# Patient Record
Sex: FEMALE | Race: OTHER | HISPANIC OR LATINO | URBAN - METROPOLITAN AREA
[De-identification: names, ages, dates, MRNs, and addresses within clinical notes are randomized per-mention and may not be internally consistent; named-entity substitution may affect disease eponyms.]

---

## 2020-03-25 ENCOUNTER — EMERGENCY (EMERGENCY)
Facility: HOSPITAL | Age: 35
LOS: 1 days | Discharge: HOME | End: 2020-03-25
Admitting: EMERGENCY MEDICINE
Payer: COMMERCIAL

## 2020-03-25 VITALS — HEIGHT: 60 IN | TEMPERATURE: 100 F | WEIGHT: 134.92 LBS

## 2020-03-25 DIAGNOSIS — R53.81 OTHER MALAISE: ICD-10-CM

## 2020-03-25 DIAGNOSIS — J06.9 ACUTE UPPER RESPIRATORY INFECTION, UNSPECIFIED: ICD-10-CM

## 2020-03-25 DIAGNOSIS — R50.9 FEVER, UNSPECIFIED: ICD-10-CM

## 2020-03-25 PROCEDURE — 99284 EMERGENCY DEPT VISIT MOD MDM: CPT

## 2020-03-25 PROCEDURE — 87635 SARS-COV-2 COVID-19 AMP PRB: CPT

## 2020-03-25 PROCEDURE — 99283 EMERGENCY DEPT VISIT LOW MDM: CPT

## 2020-03-25 NOTE — ED PROVIDER NOTE - CLINICAL SUMMARY MEDICAL DECISION MAKING FREE TEXT BOX
34 y/o F presenting with malaise, body aches, and low grade intermittent fever x 1 week. COVID-19 testing pending. Patient is otherwise non-toxic and well-appearing in no respiratory distress. No known hx of exposure to positive coronavirus cases or international travel to ([place], [place], [place], [place], [place]). Patient has been given education on Novel Coronavirus and testing education. Patient has been advised to self-quarantine for 14 days, await test results, and given return precautions for any concerning or worsening of their symptoms.

## 2020-03-25 NOTE — ED ADULT TRIAGE NOTE - CHIEF COMPLAINT QUOTE
Patient complaining of chills and body aches.  Patient denies any cough, fevers, diarrhea, exposure to Covid-19, or any other complaints at this time.

## 2020-03-25 NOTE — ED PROVIDER NOTE - PATIENT PORTAL LINK FT
You can access the FollowMyHealth Patient Portal offered by [facility] by registering at the following website: [email or web address]. By joining [employer]’s FollowMyHealth portal, you will also be able to view your health information using other applications (apps) compatible with our system.

## 2020-03-25 NOTE — ED ADULT NURSE NOTE - NSIMPLEMENTINTERV_GEN_ALL_ED
Implemented All Universal Safety Interventions:  [place] to call system. Call bell, personal items and telephone within reach. Instruct patient to call for assistance. Room bathroom lighting operational. Non-slip footwear when patient is off stretcher. Physically safe environment: no spills, clutter or unnecessary equipment. Stretcher in lowest position, wheels locked, appropriate side rails in place.

## 2020-03-25 NOTE — ED PROVIDER NOTE - NSFOLLOWUPINSTRUCTIONS_ED_ALL_ED_FT
I have discussed the discharge plan with the patient. The patient agrees with the plan, as discussed.  The patient understands Emergency Department diagnosis is a preliminary diagnosis often based on limited information and that the patient must adhere to the follow-up plan as discussed.  The patient understands that if the symptoms worsen or if prescribed medications do not have the desired/planned effect that the patient may return to the Emergency Department at any time for further evaluation and treatment.  If you develop worsening symptoms, such as severe shortness of breath, please call [phone] option #9. They will assist you in determining your next steps.     You recieved a viral swab and testing for COVID-19 in the ED today. You will recieve a call soon with your results for the viral swab, while your COVID test result will take 5-7 days. As of now you should isolate yourself for the next 14 days by staying home from work/school or other obligations.    You should also:  Avoid highly populated and public areas. Avoid using public transportation, ride-sharing, or taxis.   As much as possible, separate yourself from other people in your home. If you can, you should sleep in a room away from other people in your home. Also, you should use a separate bathroom, if available.   Wear the supplied mask whenever you are around other people.   If you have a non-urgent medical appointment, please reschedule for a later date. If the appointment is urgent, please call the healthcare provider first   Wash your hands often with soap and water for at least 15 to 20 seconds or clean your hands with an alcohol-based hand  that contains 60 to 95% alcohol, covering all surfaces of your hands and rubbing them together until they feel dry. Soap and water should be used preferentially if hands are visibly dirty.   Cover your mouth and nose with a tissue when you cough or sneeze. Throw used tissues in a lined trash can; immediately wash your hands.   Avoid touching your eyes, nose, and mouth with your hands.   Avoid sharing personal household items. You should not share dishes, drinking glasses, cups, eating utensils, towels, or bedding with other people or pets in your home. After using these items, they should be washed thoroughly with soap and water.   Clean and disinfect all “high-touch” surfaces every day. High touch surfaces include counters, tabletops, doorknobs, light switches, remote controls, bathroom fixtures, toilets, phones, keyboards, tablets, and bedside tables. Also, clean any surfaces that may have blood, stool, or body fluids on them.

## 2020-03-25 NOTE — ED ADULT NURSE NOTE - OBJECTIVE STATEMENT
Pt presents to ED c/o flu-like symptoms x 1 week- body aches, chills, dry nonproductive cough. Presents to ED w/  who is also experiencing fevers/cough. Denies shortness of breath, chest pain, n/v/d, palpitations. Able to speak in clear full sentences, a&ox4.

## 2020-03-25 NOTE — ED PROVIDER NOTE - OBJECTIVE STATEMENT
36 y/o F with no PMHx, nonsmoker, presenting to the ED with malaise, body aches, and low grade intermittent fevers x 1 week; no associated cough, congestion, chest tightness or SOB. Pt with  who has similar symptoms. No recent travel, no sick contacts, and no direct contact with + COVID-19 cases.

## 2020-03-27 LAB — SARS-COV-2 RNA SPEC QL NAA+PROBE: (no result)

## 2023-07-30 NOTE — ED ADULT NURSE NOTE - SUICIDE SCREENING QUESTION 3
no rebound or guarding, no masses palpated, no swelling noted/left upper quadrant/right upper quadrant/left lower quadrant/right lower quadrant/periumbilical/umbilical/suprapubic/left costovertebral angle/right costovertebral angle
No